# Patient Record
Sex: MALE | Race: BLACK OR AFRICAN AMERICAN | NOT HISPANIC OR LATINO | Employment: FULL TIME | URBAN - METROPOLITAN AREA
[De-identification: names, ages, dates, MRNs, and addresses within clinical notes are randomized per-mention and may not be internally consistent; named-entity substitution may affect disease eponyms.]

---

## 2019-07-25 ENCOUNTER — TELEPHONE (OUTPATIENT)
Dept: PHARMACY | Facility: CLINIC | Age: 24
End: 2019-07-25

## 2019-07-25 NOTE — TELEPHONE ENCOUNTER
Patient contacted.  He is restarting Truvada as PrEP.  He had previously been on PrEP for 3-6 moths however has been off for approximately 2 weeks while he was addressing issues with his insurance company.      Patient denies any side effects, issues, or complaints with taking Truvada as PrEP in the past and furthermore denies any issues with adherence to medication or labs.  Most recent lab draw at outside provider was .    The following patient education was reinforced:    Patient plans to start Truvada on .  Truvada counseling complete. Name/ confirmed. Consult included: indication; goals of treatment; administration; storage and handling; side effects; how to handle missed doses; the importance of compliance; the importance of maintaining lab appts and follow up appts w\ MD; safe sex practices with condoms. Patient understands that medication alone is not primary HIV prophylaxis. Patient understands this medication will only serve as protection for HIV and no other STDs. Patient understands to contact [facility] and MD for any medication changes due to drug interactions. He understands the importance of 3month regular testing. He understands the importance of ONCE daily dosing due to the potential renal effects of tenofovir. All questions answered and addressed to the patients satisfaction.       Discussed the importance of staying well hydrated while on therapy. Compliance stressed - patient to take missed doses as soon as remembered, but NOT to take 2 doses in one day. Patient will report questions or concerns to myself or practitioner. Patient verbalizes understanding. I will personally f/u with patient in 2 weeks from start, and [facility] will contact patient in 3 weeks to coordinate next refill.        Patient plans to start Truvada on . (please schedule labs accordingly)  Approximately 10 mins spent with the patient on medication education.      Thank you for allowing us to participate in the  care of your patient     FELICITY Lockett.Ph., AAHIVP  Clinical Pharmacist, HIV/HCV  [facility]  Phone: [phone]

## 2019-09-17 NOTE — TELEPHONE ENCOUNTER
Refill readiness for Truvada confirmed with patient; name/ confirmed; He stated that he has [facility]. no missed doses; he has started prilosec, which was prescribed to him for heart burn. No ddi; no side effects noted; address confirmed for  shipment and  delivery. $0 copay. Patient states they have 8 doses remaining.     Rickie Eric, Pharm.D.  Pharmacy Resident, PGY-1   [facility]

## 2019-10-23 ENCOUNTER — TELEPHONE (OUTPATIENT)
Dept: PHARMACY | Facility: CLINIC | Age: 24
End: 2019-10-23

## 2019-10-23 NOTE — TELEPHONE ENCOUNTER
Refill readiness for Truvada confirmed with patient; name/ confirmed; no missed doses; no new medications; no side effects noted; address confirmed for 10/23 shipment and 10/24 delivery.  Patient states they have 1 dose remaining.     Follow up completed. Patient does not have any questions or concerns at this time. Informed patient that provider sent new prescription with 0 refills so he should touch base with provider to ensure labs and/or other requirements are completed in a timely manner to prevent interruption in therapy. Patient verbalized understanding. Patient requested I update his shipping address. New address has been added as prescription address in [facility].

## 2019-11-20 ENCOUNTER — TELEPHONE (OUTPATIENT)
Dept: PHARMACY | Facility: CLINIC | Age: 24
End: 2019-11-20

## 2019-12-20 ENCOUNTER — TELEPHONE (OUTPATIENT)
Dept: PHARMACY | Facility: CLINIC | Age: 24
End: 2019-12-20

## 2019-12-21 NOTE — TELEPHONE ENCOUNTER
Refill readiness for Truvada confirmed with patient; name/ confirmed; no missed doses; no new medications; no side effects noted; address confirmed for  shipment and  delivery.  Patient states they have 4 doses remaining.     Patient advised that labs are due.  He states he will speak with [facility] this month.     Cristian Ríos, FELICITY.Ph., AAHIVP  Clinical Pharmacist, HIV/HCV  [facility]  Phone: [phone]

## 2020-01-22 ENCOUNTER — TELEPHONE (OUTPATIENT)
Dept: PHARMACY | Facility: CLINIC | Age: 25
End: 2020-01-22

## 2020-01-22 NOTE — TELEPHONE ENCOUNTER
Refill readiness for Truvada confirmed with patient; name/ confirmed; no missed doses; no new medications; no side effects noted; address confirmed for  shipment and  delivery.  Patient states they have 0 doses remaining.     FELICITY Lockett.Ph., AAHIVP  Clinical Pharmacist, HIV/HCV  [facility]  Phone: [phone]

## 2020-02-13 ENCOUNTER — TELEPHONE (OUTPATIENT)
Dept: PHARMACY | Facility: CLINIC | Age: 25
End: 2020-02-13

## 2020-02-13 NOTE — TELEPHONE ENCOUNTER
RX call attempt 1 regarding Truvada refill from [facility]. Patient reached-- shipping out  for  arrival with patients consent. Copay of 0.00 @ 004. Address and  confirmed. Patient has about 7 doses on hand at this time. Patient has not started any new medications, has had no missed doses and no side effects present. Patient is currently taking the medication as directed by doctors instruction, Take 1 tablet by mouth once daily. Patient does have a safe place in their residence to keep medication at desired temperature away from small children and pets. Patient also does have the capability of contacting 911 in the event of an emergency. Patient states they do not have any questions or concerns at this time.

## 2020-03-16 ENCOUNTER — TELEPHONE (OUTPATIENT)
Dept: PHARMACY | Facility: CLINIC | Age: 25
End: 2020-03-16

## 2020-03-16 NOTE — TELEPHONE ENCOUNTER
RX call attempt 1 regarding Truvada refill from [facility]. Patient reached-- shipping out 3/18 for 3/19 arrival with patients consent. Copay of 0.00 @ 004. Address and  confirmed. Patient has about 5 days of medication on hand at this time. Patient has not started any new medications, has had no missed doses and no side effects present. Patient is currently taking the medication as directed by doctors instruction, Take 1 tablet by mouth once daily. Patient does have a safe place in their residence to keep medication at desired temperature away from small children and pets. Patient also does have the capability of contacting 911 in the event of an emergency. Patient states they do not have any questions or concerns at this time.

## 2020-04-20 ENCOUNTER — TELEPHONE (OUTPATIENT)
Dept: PHARMACY | Facility: CLINIC | Age: 25
End: 2020-04-20

## 2020-04-20 NOTE — TELEPHONE ENCOUNTER
Incoming call from patient regarding Truvada refill. His prescription has no refills remaining. Patient currently has 2 doses remaining and needs next refill by 4/23. He states he completed lab work on 4/16 from outside lab. Informed patient that if he does not hear from [facility] by 4/21, to give us a call and have us verbally call provider's office for refill request. Patient verbalized understanding    Rickie Eric, Pharm.D.  Pharmacy Resident, PGY-1   [facility]

## 2020-05-08 ENCOUNTER — HISTORICAL (OUTPATIENT)
Dept: LAB | Facility: HOSPITAL | Age: 25
End: 2020-05-08

## 2020-05-13 ENCOUNTER — TELEPHONE (OUTPATIENT)
Dept: PHARMACY | Facility: CLINIC | Age: 25
End: 2020-05-13

## 2020-06-11 ENCOUNTER — TELEPHONE (OUTPATIENT)
Dept: PHARMACY | Facility: CLINIC | Age: 25
End: 2020-06-11

## 2020-06-11 NOTE — TELEPHONE ENCOUNTER
Refill call regarding truvada at [facility]. Copay $0. Patient is in need of a refill, will ship 6.17 to arrive 6.18 with patient consent. Patient has not started any new medications including OTC drugs. Patient has not had any medication/ dose or instruction changes. No new allergies or side effects reported with this shipment. Medication is being taken as prescribed by physician and properly stored. Two patient identifiers:  and Address verified. Patient has no questions or concerns for [facility]. Patient has ~8 days left on hand.

## 2020-07-08 ENCOUNTER — HISTORICAL (OUTPATIENT)
Dept: LAB | Facility: HOSPITAL | Age: 25
End: 2020-07-08

## 2020-07-08 LAB — SARS-COV-2 RNA RESP QL NAA+PROBE: DETECTED

## 2020-07-09 ENCOUNTER — TELEPHONE (OUTPATIENT)
Dept: PHARMACY | Facility: CLINIC | Age: 25
End: 2020-07-09

## 2020-07-13 ENCOUNTER — TELEPHONE (OUTPATIENT)
Dept: PHARMACY | Facility: CLINIC | Age: 25
End: 2020-07-13

## 2020-07-23 ENCOUNTER — TELEPHONE (OUTPATIENT)
Dept: PHARMACY | Facility: CLINIC | Age: 25
End: 2020-07-23

## 2020-07-23 RX ORDER — LISINOPRIL 2.5 MG/1
5 TABLET ORAL DAILY
COMMUNITY
Start: 2020-07-23

## 2020-07-23 NOTE — TELEPHONE ENCOUNTER
Truvada refill confirmed. Pt completed HIV labs on 7/23/20. HIV 1/2 Ag/Ab: Negative per provider office. Pt started new medication: Lisinopril 2.5mg po qd. Medication list updated. No DDIs present. Advised pt that Lisinopril is safe for coadministration with Truvada. Pt may take both medications at the same time if desired. No new allergies or medical conditions reported. Pt has 2 doses of Truvada remaining. [facility] will ship Truvada refill on 7/23 via [employer] for delivery on 7/24. No additional questions or concerns.

## 2020-08-20 ENCOUNTER — TELEPHONE (OUTPATIENT)
Dept: PHARMACY | Facility: CLINIC | Age: 25
End: 2020-08-20

## 2020-09-15 ENCOUNTER — TELEPHONE (OUTPATIENT)
Dept: PHARMACY | Facility: CLINIC | Age: 25
End: 2020-09-15

## 2020-10-13 ENCOUNTER — TELEPHONE (OUTPATIENT)
Dept: PHARMACY | Facility: CLINIC | Age: 25
End: 2020-10-13

## 2020-10-13 NOTE — TELEPHONE ENCOUNTER
Truvada for PrEP clinical f/u completed. Patient is out of refills and will reach out to provider to set appointment for labs.  Confirmed 2 patient identifiers - name and . Therapy appropriate.     Medication list and allergies reviewed (reviewed and documented in [facility] and [facility]). No DDIs found during this encounter. Patient takes medication at 6-7am, has 8 doses left, and reports 0 missed doses. Pt reports the following side effects - denies any upset stomach, headache, fatigue, rash, or weight loss. Pt reports storing the medication in his bathroom drawer.  Quality of life assessment performed - patient recently had surgery for testicular torsion, but is feeling much better now. Was prescribed ketorolac and percocet, but hasn't taken them for a few days now. He has a support system of friends and family and reports no trouble keeping up with his medications..  Disease education offered, but patient experienced to treatment. He was reminded of common signs of HIV infection to look out for, and he expressed understanding. Patient counseled on importance of maintaining adherence and keeping lab appointments which were scheduled.   - Patient understands [facility] will call him back to schedule the refill shipment once the RX is received. He plans to call the provider today to schedule labs and request new RX be sent to [facility].    Addressed and answered all questions to patient's satisfaction. Patient aware to contact [facility] if questions or concerns arise.

## 2020-12-16 ENCOUNTER — SPECIALTY PHARMACY (OUTPATIENT)
Dept: PHARMACY | Facility: CLINIC | Age: 25
End: 2020-12-16

## 2020-12-16 NOTE — TELEPHONE ENCOUNTER
Truvada for PrEP 4th call attempt for refill.   - Mailbox full  - Postcard sent to patient  - No active MyChart  - Provider messaged due to lack of contact    [facility] will f/u in 1-week. If no response, will close out referral.

## 2020-12-23 NOTE — TELEPHONE ENCOUNTER
No response by patient to [facility]'s mail/phone correspondence. Will close out patient of [facility] services due to lack of contact at this time.

## 2022-07-28 ENCOUNTER — CLINICAL SUPPORT (OUTPATIENT)
Dept: URGENT CARE | Facility: CLINIC | Age: 27
End: 2022-07-28
Payer: COMMERCIAL

## 2022-07-28 DIAGNOSIS — R09.81 CONGESTED NOSE: ICD-10-CM

## 2022-07-28 DIAGNOSIS — R09.81 CONGESTED NOSE: Primary | ICD-10-CM

## 2022-07-28 LAB
CTP QC/QA: YES
SARS-COV-2 RDRP RESP QL NAA+PROBE: NEGATIVE

## 2022-07-28 PROCEDURE — U0002: ICD-10-PCS | Mod: QW,,, | Performed by: FAMILY MEDICINE

## 2022-07-28 PROCEDURE — U0002 COVID-19 LAB TEST NON-CDC: HCPCS | Mod: QW,,, | Performed by: FAMILY MEDICINE

## 2022-07-28 NOTE — PROGRESS NOTES
Pt presents to clinic for COVID test sent by [facility]. Pt result was negative. Pt notified of result. Pt voiced thanks and understanding with no further questions.

## 2022-12-28 ENCOUNTER — OFFICE VISIT (OUTPATIENT)
Dept: URGENT CARE | Facility: CLINIC | Age: 27
End: 2022-12-28
Payer: COMMERCIAL

## 2022-12-28 VITALS
HEIGHT: 75 IN | BODY MASS INDEX: 27.35 KG/M2 | WEIGHT: 220 LBS | TEMPERATURE: 99 F | HEART RATE: 83 BPM | DIASTOLIC BLOOD PRESSURE: 89 MMHG | RESPIRATION RATE: 17 BRPM | SYSTOLIC BLOOD PRESSURE: 145 MMHG | OXYGEN SATURATION: 96 %

## 2022-12-28 DIAGNOSIS — R05.9 COUGH, UNSPECIFIED TYPE: Primary | ICD-10-CM

## 2022-12-28 LAB
CTP QC/QA: YES
CTP QC/QA: YES
POC MOLECULAR INFLUENZA A AGN: NEGATIVE
POC MOLECULAR INFLUENZA B AGN: NEGATIVE
SARS-COV-2 RDRP RESP QL NAA+PROBE: POSITIVE

## 2022-12-28 PROCEDURE — 3079F PR MOST RECENT DIASTOLIC BLOOD PRESSURE 80-89 MM HG: ICD-10-PCS | Mod: CPTII,,, | Performed by: FAMILY MEDICINE

## 2022-12-28 PROCEDURE — 4010F PR ACE/ARB THEARPY RXD/TAKEN: ICD-10-PCS | Mod: CPTII,,, | Performed by: FAMILY MEDICINE

## 2022-12-28 PROCEDURE — 4010F ACE/ARB THERAPY RXD/TAKEN: CPT | Mod: CPTII,,, | Performed by: FAMILY MEDICINE

## 2022-12-28 PROCEDURE — 3077F PR MOST RECENT SYSTOLIC BLOOD PRESSURE >= 140 MM HG: ICD-10-PCS | Mod: CPTII,,, | Performed by: FAMILY MEDICINE

## 2022-12-28 PROCEDURE — 3077F SYST BP >= 140 MM HG: CPT | Mod: CPTII,,, | Performed by: FAMILY MEDICINE

## 2022-12-28 PROCEDURE — 87635: ICD-10-PCS | Mod: QW,,, | Performed by: FAMILY MEDICINE

## 2022-12-28 PROCEDURE — 87502 INFLUENZA DNA AMP PROBE: CPT | Mod: QW,,, | Performed by: FAMILY MEDICINE

## 2022-12-28 PROCEDURE — 1159F MED LIST DOCD IN RCRD: CPT | Mod: CPTII,,, | Performed by: FAMILY MEDICINE

## 2022-12-28 PROCEDURE — 87635 SARS-COV-2 COVID-19 AMP PRB: CPT | Mod: QW,,, | Performed by: FAMILY MEDICINE

## 2022-12-28 PROCEDURE — 3008F PR BODY MASS INDEX (BMI) DOCUMENTED: ICD-10-PCS | Mod: CPTII,,, | Performed by: FAMILY MEDICINE

## 2022-12-28 PROCEDURE — 87502 POCT INFLUENZA A/B MOLECULAR: ICD-10-PCS | Mod: QW,,, | Performed by: FAMILY MEDICINE

## 2022-12-28 PROCEDURE — 3079F DIAST BP 80-89 MM HG: CPT | Mod: CPTII,,, | Performed by: FAMILY MEDICINE

## 2022-12-28 PROCEDURE — 1159F PR MEDICATION LIST DOCUMENTED IN MEDICAL RECORD: ICD-10-PCS | Mod: CPTII,,, | Performed by: FAMILY MEDICINE

## 2022-12-28 PROCEDURE — 99213 OFFICE O/P EST LOW 20 MIN: CPT | Mod: ,,, | Performed by: FAMILY MEDICINE

## 2022-12-28 PROCEDURE — 99213 PR OFFICE/OUTPT VISIT, EST, LEVL III, 20-29 MIN: ICD-10-PCS | Mod: ,,, | Performed by: FAMILY MEDICINE

## 2022-12-28 PROCEDURE — 3008F BODY MASS INDEX DOCD: CPT | Mod: CPTII,,, | Performed by: FAMILY MEDICINE

## 2022-12-28 RX ORDER — OXYBUTYNIN CHLORIDE 15 MG/1
15 TABLET, EXTENDED RELEASE ORAL
COMMUNITY
Start: 2022-12-27

## 2022-12-28 NOTE — PROGRESS NOTES
Patient ID: [id number]     Chief Complaint: upper respiratory tract infection symptoms    History of Present Illness:     Edy Navas is a 27 y.o. male  who presents today for symptoms of Sinus Problem (Sinus congestion, cough since 12/25. Tried taking otc tylenol/ zyrtec/ flonase spray. )      Pt denies experiencing any fevers, chills, nausea, vomiting, difficulty breathing, dysphagia, or neck stiffness.    Past Medical History:     ----------------------------  Anxiety disorder, unspecified  Chronic tonsillitis  GERD (gastroesophageal reflux disease)  Hypertension  Hypertrophy of tonsils and adenoids     Past Surgical History:   Procedure Laterality Date    ORCHIOPEXY      TONSILLECTOMY, ADENOIDECTOMY N/A 11/16/2022    Procedure: ADENOTONSILLECTOMY;  Surgeon: Tunde Gordon MD;  Location: [facility];  Service: ENT;  Laterality: N/A;       Review of patient's allergies indicates:   Allergen Reactions    Opioids - morphine analogues Rash       Outpatient Medications Marked as Taking for the 12/28/22 encounter (Office Visit) with Zackary Borges MD   Medication Sig Dispense Refill    FLUoxetine 40 MG capsule Take 40 mg by mouth once daily.      lisinopriL (PRINIVIL,ZESTRIL) 2.5 MG tablet Take 5 mg by mouth once daily.      omeprazole (PRILOSEC) 40 MG capsule Take 40 mg by mouth once daily.      oxybutynin (DITROPAN XL) 15 MG TR24 Take 15 mg by mouth.         Social History     Socioeconomic History    Marital status: Single   Tobacco Use    Smoking status: Never    Smokeless tobacco: Never   Substance and Sexual Activity    Alcohol use: Yes    Drug use: Never        Family History   Problem Relation Age of Onset    Hypertension Mother     Anxiety disorder Mother     Depression Mother     Hypertension Father     Depression Father     Anxiety disorder Father     No Known Problems Sister     No Known Problems Brother         Subjective:     Review of Systems   Constitutional:  Negative for chills, fever and  "malaise/fatigue.   HENT:  Positive for congestion. Negative for ear discharge, ear pain, sinus pain and sore throat.    Respiratory:  Positive for cough. Negative for sputum production, shortness of breath, wheezing and stridor.    Gastrointestinal:  Negative for abdominal pain, diarrhea, nausea and vomiting.   Genitourinary:  Negative for dysuria, frequency and urgency.   Musculoskeletal:  Negative for neck pain.   Skin:  Negative for rash.   Neurological:  Negative for headaches.     Objective:     BP (!) 145/89   Pulse 83   Temp 98.6 °F (37 °C)   Resp 17   Ht 6' 3" (1.905 m)   Wt 99.8 kg (220 lb)   SpO2 96%   BMI 27.50 kg/m²     Physical Exam  Constitutional:       General: He is not in acute distress.     Appearance: Normal appearance. He is normal weight. He is not ill-appearing or toxic-appearing.   HENT:      Head: Normocephalic and atraumatic.      Right Ear: Tympanic membrane and ear canal normal.      Left Ear: Tympanic membrane and ear canal normal.      Nose: No congestion or rhinorrhea.      Mouth/Throat:      Pharynx: Oropharynx is clear. No oropharyngeal exudate or posterior oropharyngeal erythema.   Eyes:      General:         Right eye: No discharge.         Left eye: No discharge.      Extraocular Movements: Extraocular movements intact.      Conjunctiva/sclera: Conjunctivae normal.   Cardiovascular:      Rate and Rhythm: Normal rate and regular rhythm.      Heart sounds: Normal heart sounds. No murmur heard.    No friction rub. No gallop.   Pulmonary:      Effort: Pulmonary effort is normal. No respiratory distress.      Breath sounds: Normal breath sounds. No stridor. No wheezing, rhonchi or rales.   Chest:      Chest wall: No tenderness.   Musculoskeletal:      Cervical back: No rigidity or tenderness.   Lymphadenopathy:      Cervical: No cervical adenopathy.   Skin:     Coloration: Skin is not pale.   Neurological:      Mental Status: He is alert and oriented to person, place, and time. " Mental status is at baseline.   Psychiatric:         Mood and Affect: Mood normal.         Behavior: Behavior normal.       Assessment & Plan:       ICD-10-CM ICD-9-CM   1. Cough, unspecified type  R05.9 786.2        1. Cough, unspecified type  -     POCT Influenza A/B Molecular  -     POCT COVID-19 Rapid Screening         Influenza negative, and Covid negative.  Patient declines symptomatic treatment.  No comorbidities to qualify for Paxlovid.  Discussed quarantine recommendations.  Pt will return  if symptoms change, worsen, or do not resolved within the expected time range.

## 2025-02-05 ENCOUNTER — TELEPHONE (OUTPATIENT)
Dept: URGENT CARE | Facility: CLINIC | Age: 30
End: 2025-02-05
Payer: COMMERCIAL